# Patient Record
Sex: FEMALE | Race: ASIAN | NOT HISPANIC OR LATINO | URBAN - METROPOLITAN AREA
[De-identification: names, ages, dates, MRNs, and addresses within clinical notes are randomized per-mention and may not be internally consistent; named-entity substitution may affect disease eponyms.]

---

## 2019-09-22 ENCOUNTER — EMERGENCY (EMERGENCY)
Facility: HOSPITAL | Age: 29
LOS: 1 days | Discharge: HOME | End: 2019-09-22
Admitting: EMERGENCY MEDICINE
Payer: COMMERCIAL

## 2019-09-22 VITALS
WEIGHT: 115.08 LBS | RESPIRATION RATE: 19 BRPM | OXYGEN SATURATION: 98 % | SYSTOLIC BLOOD PRESSURE: 102 MMHG | DIASTOLIC BLOOD PRESSURE: 70 MMHG | TEMPERATURE: 98 F | HEART RATE: 86 BPM

## 2019-09-22 VITALS
SYSTOLIC BLOOD PRESSURE: 102 MMHG | TEMPERATURE: 98 F | RESPIRATION RATE: 16 BRPM | DIASTOLIC BLOOD PRESSURE: 63 MMHG | HEART RATE: 90 BPM | OXYGEN SATURATION: 100 %

## 2019-09-22 LAB
ALBUMIN SERPL ELPH-MCNC: 4 G/DL — SIGNIFICANT CHANGE UP (ref 3.4–5)
ALP SERPL-CCNC: 49 U/L — SIGNIFICANT CHANGE UP (ref 40–120)
ALT FLD-CCNC: 19 U/L — SIGNIFICANT CHANGE UP (ref 12–42)
ANION GAP SERPL CALC-SCNC: 8 MMOL/L — LOW (ref 9–16)
APPEARANCE UR: CLEAR — SIGNIFICANT CHANGE UP
AST SERPL-CCNC: 10 U/L — LOW (ref 15–37)
BASOPHILS NFR BLD AUTO: 0.4 % — SIGNIFICANT CHANGE UP (ref 0–2)
BILIRUB SERPL-MCNC: 0.7 MG/DL — SIGNIFICANT CHANGE UP (ref 0.2–1.2)
BILIRUB UR-MCNC: NEGATIVE — SIGNIFICANT CHANGE UP
BUN SERPL-MCNC: 13 MG/DL — SIGNIFICANT CHANGE UP (ref 7–23)
CALCIUM SERPL-MCNC: 9.1 MG/DL — SIGNIFICANT CHANGE UP (ref 8.5–10.5)
CHLORIDE SERPL-SCNC: 105 MMOL/L — SIGNIFICANT CHANGE UP (ref 96–108)
CO2 SERPL-SCNC: 27 MMOL/L — SIGNIFICANT CHANGE UP (ref 22–31)
COLOR SPEC: YELLOW — SIGNIFICANT CHANGE UP
CREAT SERPL-MCNC: 0.52 MG/DL — SIGNIFICANT CHANGE UP (ref 0.5–1.3)
DIFF PNL FLD: NEGATIVE — SIGNIFICANT CHANGE UP
EOSINOPHIL NFR BLD AUTO: 0.6 % — SIGNIFICANT CHANGE UP (ref 0–6)
GLUCOSE SERPL-MCNC: 100 MG/DL — HIGH (ref 70–99)
GLUCOSE UR QL: NEGATIVE — SIGNIFICANT CHANGE UP
HCT VFR BLD CALC: 35.6 % — SIGNIFICANT CHANGE UP (ref 34.5–45)
HGB BLD-MCNC: 11.9 G/DL — SIGNIFICANT CHANGE UP (ref 11.5–15.5)
IMM GRANULOCYTES NFR BLD AUTO: 0.4 % — SIGNIFICANT CHANGE UP (ref 0–1.5)
KETONES UR-MCNC: 40 MG/DL
LEUKOCYTE ESTERASE UR-ACNC: ABNORMAL
LYMPHOCYTES # BLD AUTO: 15 % — SIGNIFICANT CHANGE UP (ref 13–44)
MCHC RBC-ENTMCNC: 30.1 PG — SIGNIFICANT CHANGE UP (ref 27–34)
MCHC RBC-ENTMCNC: 33.4 G/DL — SIGNIFICANT CHANGE UP (ref 32–36)
MCV RBC AUTO: 89.9 FL — SIGNIFICANT CHANGE UP (ref 80–100)
MONOCYTES NFR BLD AUTO: 5 % — SIGNIFICANT CHANGE UP (ref 2–14)
NEUTROPHILS NFR BLD AUTO: 78.6 % — HIGH (ref 43–77)
NITRITE UR-MCNC: NEGATIVE — SIGNIFICANT CHANGE UP
PH UR: 6 — SIGNIFICANT CHANGE UP (ref 5–8)
PLATELET # BLD AUTO: 286 K/UL — SIGNIFICANT CHANGE UP (ref 150–400)
POTASSIUM SERPL-MCNC: 3.9 MMOL/L — SIGNIFICANT CHANGE UP (ref 3.5–5.3)
POTASSIUM SERPL-SCNC: 3.9 MMOL/L — SIGNIFICANT CHANGE UP (ref 3.5–5.3)
PROT SERPL-MCNC: 7.2 G/DL — SIGNIFICANT CHANGE UP (ref 6.4–8.2)
PROT UR-MCNC: NEGATIVE MG/DL — SIGNIFICANT CHANGE UP
RBC # BLD: 3.96 M/UL — SIGNIFICANT CHANGE UP (ref 3.8–5.2)
RBC # FLD: 11.4 % — SIGNIFICANT CHANGE UP (ref 10.3–14.5)
SODIUM SERPL-SCNC: 140 MMOL/L — SIGNIFICANT CHANGE UP (ref 132–145)
SP GR SPEC: 1.01 — SIGNIFICANT CHANGE UP (ref 1–1.03)
UROBILINOGEN FLD QL: 0.2 E.U./DL — SIGNIFICANT CHANGE UP
WBC # BLD: 5.4 K/UL — SIGNIFICANT CHANGE UP (ref 3.8–10.5)
WBC # FLD AUTO: 5.4 K/UL — SIGNIFICANT CHANGE UP (ref 3.8–10.5)

## 2019-09-22 PROCEDURE — 99284 EMERGENCY DEPT VISIT MOD MDM: CPT

## 2019-09-22 PROCEDURE — 93010 ELECTROCARDIOGRAM REPORT: CPT

## 2019-09-22 RX ORDER — METOCLOPRAMIDE HCL 10 MG
10 TABLET ORAL ONCE
Refills: 0 | Status: COMPLETED | OUTPATIENT
Start: 2019-09-22 | End: 2019-09-22

## 2019-09-22 RX ORDER — MECLIZINE HCL 12.5 MG
25 TABLET ORAL ONCE
Refills: 0 | Status: COMPLETED | OUTPATIENT
Start: 2019-09-22 | End: 2019-09-22

## 2019-09-22 RX ORDER — ONDANSETRON 8 MG/1
4 TABLET, FILM COATED ORAL ONCE
Refills: 0 | Status: DISCONTINUED | OUTPATIENT
Start: 2019-09-22 | End: 2019-09-22

## 2019-09-22 RX ORDER — MECLIZINE HCL 12.5 MG
1 TABLET ORAL
Qty: 30 | Refills: 0
Start: 2019-09-22

## 2019-09-22 RX ORDER — SODIUM CHLORIDE 9 MG/ML
1000 INJECTION INTRAMUSCULAR; INTRAVENOUS; SUBCUTANEOUS ONCE
Refills: 0 | Status: COMPLETED | OUTPATIENT
Start: 2019-09-22 | End: 2019-09-22

## 2019-09-22 RX ADMIN — Medication 25 MILLIGRAM(S): at 13:36

## 2019-09-22 RX ADMIN — SODIUM CHLORIDE 1000 MILLILITER(S): 9 INJECTION INTRAMUSCULAR; INTRAVENOUS; SUBCUTANEOUS at 13:36

## 2019-09-22 RX ADMIN — Medication 10 MILLIGRAM(S): at 14:13

## 2019-09-22 NOTE — ED ADULT NURSE NOTE - NSIMPLEMENTINTERV_GEN_ALL_ED
Implemented All Universal Safety Interventions:  [place] to call system. Call bell, personal items and telephone within reach. Instruct patient to call for assistance. Room bathroom lighting operational. Non-slip footwear when patient is off stretcher. Physically safe environment: no spills, clutter or unnecessary equipment. Stretcher in lowest position, wheels locked, appropriate side rails in place.

## 2019-09-22 NOTE — ED PROVIDER NOTE - NSFOLLOWUPCLINICS_GEN_ALL_ED_FT
[facility]  Otolaryngology (ENT)  [address] [place] [address] - 10[address]  [place], [place] [id number]  Phone: [phone]  Fax:   Follow Up Time:

## 2019-09-22 NOTE — ED PROVIDER NOTE - CPE EDP MUSC NORM
Is This A New Presentation, Or A Follow-Up?: Rash Additional History: Pt states over the counter medication from [place] helped a little at first but stopped helping. normal...

## 2019-09-22 NOTE — ED PROVIDER NOTE - PATIENT PORTAL LINK FT
You can access the FollowMyHealth Patient Portal offered by [facility] by registering at the following website: [email or web address]. By joining [employer]’s FollowMyHealth portal, you will also be able to view your health information using other applications (apps) compatible with our system.

## 2019-09-22 NOTE — ED PROVIDER NOTE - NSFOLLOWUPINSTRUCTIONS_ED_ALL_ED_FT
TAKE 1 TAB MECLIZINE 25 MG BY MOUTH THREE TIMES A DAY AS NEEDED FOR DIZZINESS.    FOLLOW UP WITH EITHER DR. GAUDENCIO LEGGETT (ENT) OR WITH THE ENT CLINIC IN 24-48 HOURS FOR FURTHER EVALUATION AND MANAGEMENT AS DISCUSSED.    RETURN TO THE ER IMMEDIATELY IF THE DIZZINESS WORSENS OR IF YOU DEVELOP A SEVERE HEADACHE, VISION CHANGES, LOSS OF CONSCIOUSNESS, CHEST PAIN, DIFFICULTY BREATHING, IRREGULAR OR FLUTTERING HEART BEATS, OR ANY OTHER CONCERNING SIGNS OR SYMPTOMS.

## 2019-09-22 NOTE — ED PROVIDER NOTE - OBJECTIVE STATEMENT
29y/o female with PMHX of ulcerative colitis presents to the ED with sudden onset of dizziness this morning while lying in bed attempting to sit up. When she moved her head the entire room began to spin and she felt nauseous. Dizziness is persistent and intermittent in nature, only being triggered when she moves her head. She does mention that she is currently being treated for a "double ear infection" with prescription ear drops and intranasal Afrin. She does report having a mild dull achy headache and has not taken any medication for the headache or the dizziness this morning. She was going to walk to the ER but states she felt too unsteady on feet due to dizziness so she called an ambulence to bring her here without incident.     Denies f/c confusion LOC diplopia photophobia tinnitus otic discharge sore throat cough CP SOB palpitations abdominal pain vomitting diarrhea 27y/o female with PMHX of ulcerative colitis presents to the ED with sudden onset of dizziness this morning while lying in bed attempting to sit up. When she moved her head the entire room began to spin and she felt nauseous. Dizziness is persistent and intermittent in nature, only being triggered when she moves her head. She does mention that she is currently being treated for a "double ear infection" with prescription ear drops and intranasal Afrin. She does report having a mild dull achy headache and has not taken any medication for the headache or the dizziness this morning. She was going to walk to the ER but states she felt too unsteady on feet due to dizziness so she called an ambulance to bring her here without incident.     Denies f/c confusion LOC diplopia photophobia tinnitus otic discharge sore throat cough CP SOB palpitations abdominal pain vomitting diarrhea

## 2019-09-22 NOTE — ED ADULT NURSE NOTE - OBJECTIVE STATEMENT
Patient c/o worsening dizziness and nausea since this morning, dizziness is worse when she moves her head. Patient is currently being treated for bialteral ear infections,. Denies any recent falls or LOC

## 2019-09-22 NOTE — ED PROVIDER NOTE - CARE PROVIDER_API CALL
Yen Stratton)  Otolaryngology  [address] [place] [address], [address]  [place], [place] [address]  Phone: [phone]  Fax: [phone]  Follow Up Time:

## 2019-09-22 NOTE — ED PROVIDER NOTE - PROGRESS NOTE DETAILS
Pt improved with IVFs, Meclizine, and Reglan. She now reports feeling much better and states she feels comfortable being discharged. She agrees to F/U with ENT in 24-48 hours for further eval and management as discussed. Strict return precautions reviewed with pt in which pt verbalizes understanding and agrees to.

## 2019-09-22 NOTE — ED PROVIDER NOTE - CLINICAL SUMMARY MEDICAL DECISION MAKING FREE TEXT BOX
27 y/o female presents with spinning like dizziness associated with nausea since this morning. Clinical presentation likely consistent with vertigo. Will order Labs EKG iv fluids, Meclizine, Reglan and reassess. 29 y/o female presents with spinning like dizziness associated with nausea since this morning. No focal neurodeficits. Clinical presentation likely consistent with vertigo. Will order Labs EKG iv fluids, Meclizine, Reglan and reassess.

## 2019-09-22 NOTE — ED ADULT TRIAGE NOTE - CHIEF COMPLAINT QUOTE
here for dizziness, bilateral ear pain which is being treated with ear drops- states dizziness worsens with movement-

## 2019-09-23 ENCOUNTER — APPOINTMENT (OUTPATIENT)
Dept: OTOLARYNGOLOGY | Facility: CLINIC | Age: 29
End: 2019-09-23
Payer: COMMERCIAL

## 2019-09-23 VITALS
OXYGEN SATURATION: 98 % | WEIGHT: 98 LBS | BODY MASS INDEX: 18.5 KG/M2 | HEART RATE: 82 BPM | SYSTOLIC BLOOD PRESSURE: 104 MMHG | DIASTOLIC BLOOD PRESSURE: 72 MMHG | TEMPERATURE: 98.4 F | HEIGHT: 61 IN

## 2019-09-23 DIAGNOSIS — Z83.3 FAMILY HISTORY OF DIABETES MELLITUS: ICD-10-CM

## 2019-09-23 DIAGNOSIS — R42 DIZZINESS AND GIDDINESS: ICD-10-CM

## 2019-09-23 DIAGNOSIS — H93.13 TINNITUS, BILATERAL: ICD-10-CM

## 2019-09-23 DIAGNOSIS — Z78.9 OTHER SPECIFIED HEALTH STATUS: ICD-10-CM

## 2019-09-23 DIAGNOSIS — Z82.3 FAMILY HISTORY OF STROKE: ICD-10-CM

## 2019-09-23 DIAGNOSIS — Z82.49 FAMILY HISTORY OF ISCHEMIC HEART DISEASE AND OTHER DISEASES OF THE CIRCULATORY SYSTEM: ICD-10-CM

## 2019-09-23 PROBLEM — Z00.00 ENCOUNTER FOR PREVENTIVE HEALTH EXAMINATION: Status: ACTIVE | Noted: 2019-09-23

## 2019-09-23 PROCEDURE — 92585: CPT

## 2019-09-23 PROCEDURE — 92557 COMPREHENSIVE HEARING TEST: CPT

## 2019-09-23 PROCEDURE — 92550 TYMPANOMETRY & REFLEX THRESH: CPT

## 2019-09-23 PROCEDURE — 92504 EAR MICROSCOPY EXAMINATION: CPT

## 2019-09-23 PROCEDURE — 99204 OFFICE O/P NEW MOD 45 MIN: CPT | Mod: 25

## 2019-09-23 RX ORDER — TACROLIMUS 1 MG/G
0.1 OINTMENT TOPICAL
Qty: 60 | Refills: 0 | Status: ACTIVE | COMMUNITY
Start: 2019-09-06

## 2019-09-23 RX ORDER — FLUTICASONE PROPIONATE 50 UG/1
50 SPRAY, METERED NASAL
Refills: 0 | Status: ACTIVE | COMMUNITY

## 2019-09-23 RX ORDER — CIPROFLOXACIN AND DEXAMETHASONE 3; 1 MG/ML; MG/ML
0.3-0.1 SUSPENSION/ DROPS AURICULAR (OTIC)
Qty: 8 | Refills: 0 | Status: DISCONTINUED | COMMUNITY
Start: 2019-09-20

## 2019-09-23 RX ORDER — MESALAMINE 375 MG/1
0.38 CAPSULE, EXTENDED RELEASE ORAL
Qty: 120 | Refills: 0 | Status: ACTIVE | COMMUNITY
Start: 2019-03-21

## 2019-09-23 RX ORDER — SODIUM PICOSULFATE, MAGNESIUM OXIDE, AND ANHYDROUS CITRIC ACID 10; 3.5; 12 MG/160ML; G/160ML; G/160ML
10-3.5-12 MG-GM LIQUID ORAL
Qty: 320 | Refills: 0 | Status: ACTIVE | COMMUNITY
Start: 2019-09-09

## 2019-09-23 RX ORDER — MECLIZINE HYDROCHLORIDE 25 MG/1
TABLET ORAL
Refills: 0 | Status: ACTIVE | COMMUNITY

## 2019-09-23 NOTE — HISTORY OF PRESENT ILLNESS
[de-identified] : Went to urgent care last week with bilateral ear pressure and an underwater feeling; was given ciprodex. Then yesterday upon sitting up developed a spinning sensation that lasted 3 hours; was seen at the ER and was given meclizine. No lorene hearing loss but hears a helicopter-like low pitched tinnitus in both ears that has been present for 4d. Longstanding bilateral high pitched nonpulsatile tinnitus.

## 2019-09-23 NOTE — ASSESSMENT
[FreeTextEntry1] : Discussed her overall improving course; d/c ciprodex & RTC for further vertigo or nonresolution of the new tinnitus. Rpt audio 1 yr.

## 2019-09-23 NOTE — PHYSICAL EXAM
[Binocular Microscopic Exam] : Binocular microscopic exam was performed [Normal] : orientation to person, place, and time: normal [de-identified] : negative [place] Hallpike for post & lat canals bilaterally; negative fistula test AU; EOMI/PERRL w/ no resting nystagmus; CN 2-12 intact; good smooth pursuit; no PHSN and normal Hamalgyi head thrust; negative enhanced Rhomberg; unremarkable gait; no dysdiadochokinesia

## 2019-09-24 PROBLEM — K51.90 ULCERATIVE COLITIS, UNSPECIFIED, WITHOUT COMPLICATIONS: Chronic | Status: ACTIVE | Noted: 2019-09-22

## 2019-09-29 DIAGNOSIS — R42 DIZZINESS AND GIDDINESS: ICD-10-CM

## 2019-09-29 DIAGNOSIS — R11.0 NAUSEA: ICD-10-CM

## 2019-10-07 ENCOUNTER — APPOINTMENT (OUTPATIENT)
Dept: OTOLARYNGOLOGY | Facility: CLINIC | Age: 29
End: 2019-10-07

## 2021-04-17 ENCOUNTER — RESULT REVIEW (OUTPATIENT)
Age: 31
End: 2021-04-17

## 2022-11-03 NOTE — ED PROVIDER NOTE - PRINCIPAL DIAGNOSIS
1) Continue to avoid sugar-sweetened beverages    2) You stated a personal goal of including more fruits and vegetables.  Try www.eatright.org OR www.choosemyplate.gov for more ideas of how to include fruits and vegetables in your meals and snacks    3) Continue walking- establishing a routine with this is beneficial.  Make a plan for where you can continue to walk during the winter months.      
Vertigo